# Patient Record
(demographics unavailable — no encounter records)

---

## 2025-03-03 NOTE — HISTORY OF PRESENT ILLNESS
[FreeTextEntry1] : Name CALLIE BETANCUR MRN [id number]  May 15 2002 ------------------------------------------------------------------------------------------------------------------------------------------- Date of First Visit:  2025 Referring Provider/PCP: Self referred ------------------------------------------------------------------------------------------------------------------------------------------- CC: right flank pain  History of Present Illness: CALLIE BETANCUR is a 22-year-old female with no significant PMH who presents for evaluation for right flank pain.  A few weeks ago she went to an ER for an arm issue. Had urine checked and was told she had a UTI, though she was asymptomatic. Was given Macrobid to take if she developed symptoms. A few days later she had dysuria so she started the Macrobid. After taking it for 6 days she developed right flank pain and vomiting. Went to an urgent care and was switched to Bactrim, though she was told urine testing was ultimately negative for infection. She has been taking Bactrim for the past week with no improvement in right flank pain. Pain is sharp and constant, though variable in intensity.  Last night pain was severe which prompted her to be seen today. She did not check for a fever but noted she had chills and was sweating overnight. No urinary symptoms. Experiences some nausea after taking abx. Pain not improved with Advil. Naproxen caused nausea. No history of kidney stones or previous pyelonephritis.

## 2025-03-03 NOTE — ASSESSMENT
[FreeTextEntry1] : Assessment: CALLIE BETANCUR is a 22 year old female who presents with right flank pain following recent treatment for UTI.  We discussed potential causes of flank pain could be pyelonephritis, residual sensitivity following recent infection, kidney stone, musculoskeletal, among others.  Plan: -UA, UCx -CBC, BMP -If renal function WNL, can prescribe Ketorolac as needed for pain -Renal US -Follow up pending imaging